# Patient Record
Sex: MALE | Race: WHITE | Employment: FULL TIME | URBAN - METROPOLITAN AREA
[De-identification: names, ages, dates, MRNs, and addresses within clinical notes are randomized per-mention and may not be internally consistent; named-entity substitution may affect disease eponyms.]

---

## 2018-09-21 PROBLEM — R97.20 ELEVATED PSA: Status: ACTIVE | Noted: 2018-09-21

## 2018-09-21 PROBLEM — R39.15 URINARY URGENCY: Status: ACTIVE | Noted: 2018-09-21

## 2019-01-22 ENCOUNTER — ANESTHESIA EVENT (OUTPATIENT)
Dept: SURGERY | Age: 64
DRG: 708 | End: 2019-01-22
Payer: COMMERCIAL

## 2019-01-22 NOTE — H&P
ASSESSMENT:  
    ICD-10-CM ICD-9-CM    
1. Malignant neoplasm of prostate (HCC) C61 185 AMB POC URINALYSIS DIP STICK AUTO W/O MICRO 2. Hypogonadism in male E29.1 257.2    
3. Urinary urgency R39.15 788.63    
  
[id number] [place] 3+4 Adenocarcinoma of the Prostate. 2/12 cores positive involving 50-70% of cores. MRI fusion biopsy performed on 12/7/2018 for a PSA of 4.7 ng/mL. TRUS vol of 34.7 cc. 
            -Treatment options discussed including prostatectomy, radiation, HIFU, and cryoablation. Reviewed risks and benefits of each. H/o hypogonadism- currently on TRT (Androderm) for ~10 years 
  
Urinary urgency- currently on Hytrin 
  
PLAN:   
Pathology reviewed with patient Staging scans not recommended as yield is low Advised pt active surveillance is not recommended due to intermediate risks and continuance of TRT Discussed TRT encourages prostate cancer growth Recommend discontinuing Androderm patches Treatment options discussed Literature provided for patient to review Pt would like to proceed with robotic prostatectomy. Discussed risks and benefits. 
  
Preop labs and CXR all WNL. Cardiology clearance done 
  
DISCUSSION:  
He has had extensive counseling on treatment options and r/b of each. He desires surgery. We again reviewed r/b of surgery including but not limited to impact on QOL-- most prominently on urinary, bowel, and sexual function. We discussed potential for short and long term urinary incontinence and erectile dysfunction. We discussed potential for surgical complications including infection, bleeding, blood transfusion, injury to urinary and surrounding structures including rectum which could require primary repair or diverting colostomy, vascular injury, neuromuscular injury related to positioning, penile shortening, and bladder neck contracture. We discussed the possibility of positive margins and possible need for adjuvant or salvage therapies in the future. We discussed other perioperative risks including DVT, PE, CVA, MI, pneumonia, and death. He is well informed and all questions were answered. Suri Cummins MD  
 
Patient's BMI is out of the normal parameters. Information about BMI was given and patient was advised to follow-up with their PCP for further management. Chief Complaint Patient presents with  Prostate Cancer  
  
  
HISTORY OF PRESENT ILLNESS:  Bebeto Del Angel is a 58 y.o. male who presents today for consultation and has been referred by Dr. Stephanie Simmons for newly diagnosed prostate cancer. Patient had a biopsy on 12/7/2018 for a PSA of 4.7 ng/mL. Pathology revealed [place] 3+4 (1 core) and [place] 3+3 (1 core) Adenocarcinoma of the prostate. Here today to discuss treatment options. No family history of prostate cancer. 
  
He reports urinary urgency. Currently on Hytrin, recently increased dosage for urinary symptoms. Good FOS. Denies hematuria, dysuria, incontinence, and incomplete bladder emptying. Asymptomatic for infection. Denies bone or back pain. Good appetite and stable weight. 
  
Patient is currently on TRT for the last 10 years for hypogonadism. He reports baseline hypogonadism symptoms of low libido and lack of energy. He notes his Testosterone levels are monitored by PCP. He notes PCP would like T levels to remain at 500. Mos 
  
He notes he has lost ~ 50 lbs intentionally since 2010. 
  
ED: Using Levitra with benefit 
  
PMHx: A-fib, HLD, and cardiac stents x2 in 2010, S/p lap bowel resection Review of Systems Constitutional: Fever: No 
Skin: Rash: No 
HEENT: Hearing difficulty: No 
Eyes: Blurred vision: No 
Cardiovascular: Chest pain: No 
Respiratory: Shortness of breath: No 
Gastrointestinal: Nausea/vomiting: No 
Musculoskeletal: Back pain: No 
Neurological: Weakness: No 
Psychological: Memory loss: No 
Comments/additional findings:  
  
  
    
Past Medical History:  
Diagnosis Date  Arrhythmia    
  A-fib  BPH with obstruction/lower urinary tract symptoms    
 CAD (coronary artery disease) 2010  
  heart cath  Elevated PSA    
 HLD (hyperlipidemia)    
 HTN (hypertension)   Hypercholesterolemia    
 Hypogonadism male    
 Low testosterone    
 Nocturia    
 Non-nicotine vapor product user 2017  
  Small bowel Obstruction  ONI on CPAP 2012  
  CPAP  Q nite  Reflux esophagitis    
 Urinary urgency    
  
  
     
Past Surgical History:  
Procedure Laterality Date  ABDOMEN SURGERY PROC UNLISTED   2011  
  Hernia Repair  CARDIAC SURG PROCEDURE UNLIST   2010  
  Coronary Stent x2  
 HX CORONARY STENT PLACEMENT      
 HX GI   2017  
  small bowel resection  HX TONSILLECTOMY      
 HX UROLOGICAL   2018  
  MRI fusion bx. Dr. Jackeline Ramirez.  
  
  
Social History  
  
     
Tobacco Use  Smoking status: Former Smoker  
    Types: Pipe  
    Last attempt to quit: 1980  
    Years since quittin.9  Smokeless tobacco: Never Used  Tobacco comment: smoke a PIPE Substance Use Topics  Alcohol use: Yes  
    Alcohol/week: 4.2 oz  
    Types: 7 Glasses of wine per week  
    Comment: daily  Drug use: No  
  
  
No Known Allergies 
  
Family History Family history unknown: Yes  
  
  
      
Current Outpatient Medications Medication Sig Dispense Refill  terazosin (HYTRIN) 10 mg capsule Take 1 Cap by mouth nightly. 90 Cap 3  
 ALPRAZolam (XANAX) 0.25 mg tablet 0.25 mg.      
 ELIQUIS 5 mg tablet        
 metoprolol succinate (TOPROL-XL) 25 mg XL tablet        
 lansoprazole (PREVACID) 30 mg capsule 30 mg.      
 MV-Min-Folic Acid-Lutein 260-955 mcg chew Take 1 Tab by Mouth Once a Day.       
 peg 400-propylene glycol (SYSTANE) 0.4-0.3 % drop Instill 1 Drop in eye Twice Daily.      
 rosuvastatin (CRESTOR) 20 mg tablet 20 mg.      
 testosterone (ANDRODERM) 4 mg/24 hr pt24 4 mg.      
 LEVITRA 20 mg tablet        
  dilTIAZem ER (CARDIZEM LA) 180 mg Tb24 tablet Take 180 mg by mouth daily.      
 coenzyme q10 10 mg cap Take  by mouth.      
 Biotin 2,500 mcg cap Take 5,000 Units by mouth two (2) times a day.      
 cetirizine (ZYRTEC) 10 mg tablet Take  by mouth.      
  
Any elements of the PMH, FH, SHx, ROS, or preliminary elements of the HPI that were entered by a medical assistant have been reviewed in full. 
  
PHYSICAL EXAMINATION:  
Visit Vitals /86 Ht 5' 11\" (1.803 m) Wt 220 lb (99.8 kg) BMI 30.68 kg/m²  
  
Constitutional: WDWN, Pleasant and appropriate affect, No acute distress. CV:  No peripheral swelling noted Respiratory: No respiratory distress or difficulties Abdomen:  No abdominal masses or tenderness. No CVA tenderness. No inguinal hernias noted.  Male: Dilcia Toribio Skin: No jaundice. Neuro/Psych:  Alert and oriented x 3, affect appropriate. Lymphatic:   No enlarged inguinal lymph nodes.   
  
  
REVIEW OF LABS AND IMAGING:   
     
Results for orders placed or performed in visit on 12/19/18 AMB POC URINALYSIS DIP STICK AUTO W/O MICRO Result Value Ref Range  
  Color (UA POC)      
  Clarity (UA POC)      
  Glucose (UA POC) Negative Negative  
  Bilirubin (UA POC) 1+ Negative  
  Ketones (UA POC) Trace Negative  
  Specific gravity (UA POC) 1.015 1.001 - 1.035  
  Blood (UA POC) 2+ Negative  
  pH (UA POC) 7.0 4.6 - 8.0  
  Protein (UA POC) Trace Negative  
  Urobilinogen (UA POC) 1 mg/dL 0.2 - 1  
  Nitrites (UA POC) Negative Negative  
  Leukocyte esterase (UA POC) Negative Negative  
  Biopsy Pathology 12/7/2018: 
FINAL DIAGNOSIS: 
A. PROSTATE BIOPSY, RIGHT BASE: 
- BENIGN PROSTATIC TISSUE. B. PROSTATE BIOPSY, RIGHT LATERAL BASE: 
- BENIGN PROSTATIC TISSUE. C. PROSTATE BIOPSY, RIGHT MID: 
- BENIGN PROSTATIC TISSUE. D. PROSTATE BIOPSY, RIGHT LATERAL MID: 
- BENIGN PROSTATIC TISSUE. E. PROSTATE BIOPSY, RIGHT APEX: 
- BENIGN PROSTATIC TISSUE. F. PROSTATE BIOPSY, RIGHT LATERAL APEX: 
- BENIGN PROSTATIC TISSUE. G. PROSTATE BIOPSY, LEFT BASE: 
- ATYPICAL SMALL ACINAR PROLIFERATION (ASAP). H. PROSTATE BIOPSY, LEFT LATERAL BASE: 
- BENIGN PROSTATIC TISSUE. I. PROSTATE BIOPSY, LEFT MID: 
- ATYPICAL SMALL ACINAR PROLIFERATION (ASAP). J. PROSTATE BIOPSY, LEFT LATERAL MID: 
- PROSTATIC ADENOCARCINOMA, DENIZ SCORE = 7 (3 + 4), GRADE GROUP 2, INVOLVING ONE OUT OF ONE CORE BIOPSY AND 70% OF THE BIOPSY SURFACE 
AREA. K. PROSTATE BIOPSY, LEFT APEX: 
- BENIGN PROSTATIC TISSUE. L. PROSTATE BIOPSY, REGION OF INTEREST/LEFT LATERAL APEX: 
- PROSTATIC ADENOCARCINOMA, DENIZ SCORE = 6 (3 + 3), GRADE GROUP 1, INVOLVING FOUR OUT OF SIX CORE BIOPSIES AND 50% OF THE BIOPSY Roman Lowry MD  
Urologic Oncologist 
[facility] and Shaka Chen MD Professorship Professor of Urology [employer] Office [phone] Ext [phone] 
  
 
Date of Surgery Update: Vasyl Cardoso was seen and examined. History and physical has been reviewed. The patient has been examined. There have been no significant clinical changes since the completion of the originally dated History and Physical. 
Stopped Eliquis > 48 hrs ago, off ASA x 1 wk. Pt understands the risks and benefits of the procedure and agrees to proceed.  
 
Signed By: Rian Schwab, MD   
 January 23, 2019 12:06 PM

## 2019-01-22 NOTE — PERIOP NOTES
PAT - SURGICAL PRE-ADMISSION INSTRUCTIONS 
 
NAME:  Paola Cabello DATE:  1/22/2019 SURGERY DATE:  1/23/2019                                  SURGERY ARRIVAL TIME:   TBA 1. Do NOT eat or drink anything, including candy or gum, after MIDNIGHT on 1/22/19 , unless you have specific instructions from your Surgeon or Anesthesia Provider to do so. 2. No smoking on the day of surgery. 3. No alcohol 24 hours prior to the day of surgery. 4. No recreational drugs for one week prior to the day of surgery. 5. Leave all valuables, including money/purse, at home. 6. Remove all jewelry, nail polish, makeup (including mascara); no lotions, powders, deodorant, or perfume/cologne/after shave. 7. Glasses/Contact lenses and Dentures may be worn to the hospital.  They will be removed prior to surgery. 8. Call your doctor if symptoms of a cold or illness develop within 24 ours prior to surgery. 9. AN ADULT MUST DRIVE YOU HOME AFTER OUTPATIENT SURGERY. 10. If you are having an OUTPATIENT procedure, please make arrangements for a responsible adult to be with you for 24 hours after your surgery. 11. If you are admitted to the hospital, you will be assigned to a bed after surgery is complete. Normally a family member will not be able to see you until you are in your assigned bed. 15. Family is encouraged to accompany you to the hospital.  We ask visitors in the treatment area to be limited to ONE person at a time to ensure patient privacy. EXCEPTIONS WILL BE MADE AS NEEDED. 15. Children under 12 are discouraged from entering the treatment area and need to be supervised by an adult when in the waiting room. Special Instructions: 
 
Bring CPAP., Complete bowel prep per MD instructions., STOP anticoagulants AT LEAST 1 WEEK PRIOR to your surgery or, follow other MD instructions:  Heidi Keene Patient Prep: 
 
shower with anti-bacterial soap These surgical instructions were reviewed with PATIENT during the PAT PHONE CALL. Directions: On the morning of surgery, please go to the [facility]. Enter the building from the [facility] entrance, 1st floor (next to the Emergency Room entrance). Take the elevator to the 2nd floor. Sign in at the Registration Desk. If you have any questions and/or concerns, please do not hesitate to call: 
(Prior to the day of surgery)  [facility] unit:  [phone] (Day of surgery)  [facility] unit:  [phone]

## 2019-01-23 ENCOUNTER — HOSPITAL ENCOUNTER (INPATIENT)
Age: 64
LOS: 1 days | Discharge: HOME | DRG: 708 | End: 2019-01-24
Attending: UROLOGY | Admitting: UROLOGY
Payer: COMMERCIAL

## 2019-01-23 ENCOUNTER — ANESTHESIA (OUTPATIENT)
Dept: SURGERY | Age: 64
DRG: 708 | End: 2019-01-23
Payer: COMMERCIAL

## 2019-01-23 DIAGNOSIS — C61 MALIGNANT NEOPLASM OF PROSTATE (HCC): Primary | ICD-10-CM

## 2019-01-23 LAB
ABO + RH BLD: NORMAL
BLOOD GROUP ANTIBODIES SERPL: NORMAL
SPECIMEN EXP DATE BLD: NORMAL

## 2019-01-23 PROCEDURE — 74011000250 HC RX REV CODE- 250

## 2019-01-23 PROCEDURE — 77030010939 HC CLP LIG TELE -B: Performed by: UROLOGY

## 2019-01-23 PROCEDURE — 77030008477 HC STYL SATN SLP COVD -A: Performed by: ANESTHESIOLOGY

## 2019-01-23 PROCEDURE — 77030016151 HC PROTCTR LNS DFOG COVD -B: Performed by: UROLOGY

## 2019-01-23 PROCEDURE — 74011250637 HC RX REV CODE- 250/637: Performed by: UROLOGY

## 2019-01-23 PROCEDURE — 76210000016 HC OR PH I REC 1 TO 1.5 HR: Performed by: UROLOGY

## 2019-01-23 PROCEDURE — 77030020263 HC SOL INJ SOD CL0.9% LFCR 1000ML: Performed by: UROLOGY

## 2019-01-23 PROCEDURE — 77030032490 HC SLV COMPR SCD KNE COVD -B: Performed by: UROLOGY

## 2019-01-23 PROCEDURE — 77030035684 HC CAP REDUC TRCR ENDOSC 1SEAL J&J -B: Performed by: UROLOGY

## 2019-01-23 PROCEDURE — 77030008683 HC TU ET CUF COVD -A: Performed by: ANESTHESIOLOGY

## 2019-01-23 PROCEDURE — C1729 CATH, DRAINAGE: HCPCS | Performed by: UROLOGY

## 2019-01-23 PROCEDURE — 88305 TISSUE EXAM BY PATHOLOGIST: CPT

## 2019-01-23 PROCEDURE — 77030035045 HC TRCR ENDOSC VRSPRT BLDLSS COVD -B: Performed by: UROLOGY

## 2019-01-23 PROCEDURE — 86900 BLOOD TYPING SEROLOGIC ABO: CPT

## 2019-01-23 PROCEDURE — 77030008606 HC TRCR ENDOSC KII AMR -B: Performed by: UROLOGY

## 2019-01-23 PROCEDURE — 77030018846 HC SOL IRR STRL H20 ICUM -A: Performed by: UROLOGY

## 2019-01-23 PROCEDURE — 74011250637 HC RX REV CODE- 250/637: Performed by: NURSE ANESTHETIST, CERTIFIED REGISTERED

## 2019-01-23 PROCEDURE — 8E0W4CZ ROBOTIC ASSISTED PROCEDURE OF TRUNK REGION, PERCUTANEOUS ENDOSCOPIC APPROACH: ICD-10-PCS | Performed by: UROLOGY

## 2019-01-23 PROCEDURE — 74011250636 HC RX REV CODE- 250/636: Performed by: UROLOGY

## 2019-01-23 PROCEDURE — 77030010513 HC APPL CLP LIG J&J -C: Performed by: UROLOGY

## 2019-01-23 PROCEDURE — 74011250636 HC RX REV CODE- 250/636

## 2019-01-23 PROCEDURE — 77030009852 HC PCH RTVR ENDOSC COVD -B: Performed by: UROLOGY

## 2019-01-23 PROCEDURE — 0VT04ZZ RESECTION OF PROSTATE, PERCUTANEOUS ENDOSCOPIC APPROACH: ICD-10-PCS | Performed by: UROLOGY

## 2019-01-23 PROCEDURE — 0VBQ4ZZ EXCISION OF BILATERAL VAS DEFERENS, PERCUTANEOUS ENDOSCOPIC APPROACH: ICD-10-PCS | Performed by: UROLOGY

## 2019-01-23 PROCEDURE — 77010033678 HC OXYGEN DAILY

## 2019-01-23 PROCEDURE — 76060000040 HC ANESTHESIA 4.5 TO 5 HR: Performed by: UROLOGY

## 2019-01-23 PROCEDURE — 77030035048 HC TRCR ENDOSC OPTCL COVD -B: Performed by: UROLOGY

## 2019-01-23 PROCEDURE — 77030018813 HC SCIS LAPSCP EPIX DISP AMR -B: Performed by: UROLOGY

## 2019-01-23 PROCEDURE — 77030013079 HC BLNKT BAIR HGGR 3M -A: Performed by: ANESTHESIOLOGY

## 2019-01-23 PROCEDURE — 77030039266 HC ADH SKN EXOFIN S2SG -A: Performed by: UROLOGY

## 2019-01-23 PROCEDURE — 74011000250 HC RX REV CODE- 250: Performed by: UROLOGY

## 2019-01-23 PROCEDURE — 77030002966 HC SUT PDS J&J -A: Performed by: UROLOGY

## 2019-01-23 PROCEDURE — 77030010935 HC CLP LIG ABSRB TELE -B: Performed by: UROLOGY

## 2019-01-23 PROCEDURE — 0VT34ZZ RESECTION OF BILATERAL SEMINAL VESICLES, PERCUTANEOUS ENDOSCOPIC APPROACH: ICD-10-PCS | Performed by: UROLOGY

## 2019-01-23 PROCEDURE — 77030002933 HC SUT MCRYL J&J -A: Performed by: UROLOGY

## 2019-01-23 PROCEDURE — 77030002986 HC SUT PROL J&J -A: Performed by: UROLOGY

## 2019-01-23 PROCEDURE — 74011250636 HC RX REV CODE- 250/636: Performed by: NURSE ANESTHETIST, CERTIFIED REGISTERED

## 2019-01-23 PROCEDURE — 77030009848 HC PASSR SUT SET COOP -C: Performed by: UROLOGY

## 2019-01-23 PROCEDURE — 77030010545: Performed by: UROLOGY

## 2019-01-23 PROCEDURE — 77030013449 HC CLP LIG TELE -A: Performed by: UROLOGY

## 2019-01-23 PROCEDURE — 88304 TISSUE EXAM BY PATHOLOGIST: CPT

## 2019-01-23 PROCEDURE — 77030018846 HC SOL IRR STRL H20 ICUM -A

## 2019-01-23 PROCEDURE — 77030034696 HC CATH URETH FOL 2W BARD -A: Performed by: UROLOGY

## 2019-01-23 PROCEDURE — 77030022704 HC SUT VLOC COVD -B: Performed by: UROLOGY

## 2019-01-23 PROCEDURE — 77030008771 HC TU NG SALEM SUMP -A: Performed by: ANESTHESIOLOGY

## 2019-01-23 PROCEDURE — 77030035277 HC OBTRTR BLDELSS DISP INTU -B: Performed by: UROLOGY

## 2019-01-23 PROCEDURE — 88309 TISSUE EXAM BY PATHOLOGIST: CPT

## 2019-01-23 PROCEDURE — 77030031139 HC SUT VCRL2 J&J -A: Performed by: UROLOGY

## 2019-01-23 PROCEDURE — 76010000881 HC OR TIME 4.5 TO 5HR INTENSV - TIER 2: Performed by: UROLOGY

## 2019-01-23 PROCEDURE — 77030003028 HC SUT VCRL J&J -A: Performed by: UROLOGY

## 2019-01-23 PROCEDURE — 77030008462 HC STPLR SKN PROX J&J -A: Performed by: UROLOGY

## 2019-01-23 PROCEDURE — 65270000029 HC RM PRIVATE

## 2019-01-23 PROCEDURE — 74011000272 HC RX REV CODE- 272: Performed by: UROLOGY

## 2019-01-23 RX ORDER — OXYCODONE AND ACETAMINOPHEN 5; 325 MG/1; MG/1
1 TABLET ORAL
Status: DISCONTINUED | OUTPATIENT
Start: 2019-01-23 | End: 2019-01-24 | Stop reason: HOSPADM

## 2019-01-23 RX ORDER — MORPHINE SULFATE 4 MG/ML
2-4 INJECTION INTRAVENOUS
Status: DISCONTINUED | OUTPATIENT
Start: 2019-01-23 | End: 2019-01-23 | Stop reason: HOSPADM

## 2019-01-23 RX ORDER — OXYCODONE AND ACETAMINOPHEN 5; 325 MG/1; MG/1
1-2 TABLET ORAL
Status: DISCONTINUED | OUTPATIENT
Start: 2019-01-23 | End: 2019-01-23 | Stop reason: HOSPADM

## 2019-01-23 RX ORDER — TERAZOSIN 5 MG/1
10 CAPSULE ORAL
Status: DISCONTINUED | OUTPATIENT
Start: 2019-01-24 | End: 2019-01-24 | Stop reason: HOSPADM

## 2019-01-23 RX ORDER — GLYCOPYRROLATE 0.2 MG/ML
INJECTION INTRAMUSCULAR; INTRAVENOUS AS NEEDED
Status: DISCONTINUED | OUTPATIENT
Start: 2019-01-23 | End: 2019-01-23 | Stop reason: HOSPADM

## 2019-01-23 RX ORDER — OXYBUTYNIN CHLORIDE 5 MG/1
5 TABLET ORAL 3 TIMES DAILY
Qty: 30 TAB | Refills: 0 | Status: SHIPPED | OUTPATIENT
Start: 2019-01-23 | End: 2019-02-26

## 2019-01-23 RX ORDER — FAMOTIDINE 20 MG/1
TABLET, FILM COATED ORAL
Status: DISPENSED
Start: 2019-01-23 | End: 2019-01-24

## 2019-01-23 RX ORDER — ACETAMINOPHEN 325 MG/1
650 TABLET ORAL
Status: DISCONTINUED | OUTPATIENT
Start: 2019-01-23 | End: 2019-01-24 | Stop reason: HOSPADM

## 2019-01-23 RX ORDER — MIDAZOLAM HYDROCHLORIDE 1 MG/ML
INJECTION, SOLUTION INTRAMUSCULAR; INTRAVENOUS AS NEEDED
Status: DISCONTINUED | OUTPATIENT
Start: 2019-01-23 | End: 2019-01-23 | Stop reason: HOSPADM

## 2019-01-23 RX ORDER — CEFAZOLIN SODIUM 2 G/50ML
2 SOLUTION INTRAVENOUS EVERY 8 HOURS
Status: COMPLETED | OUTPATIENT
Start: 2019-01-23 | End: 2019-01-24

## 2019-01-23 RX ORDER — METHYLENE BLUE 10 MG/ML
INJECTION INTRAVENOUS AS NEEDED
Status: DISCONTINUED | OUTPATIENT
Start: 2019-01-23 | End: 2019-01-23 | Stop reason: HOSPADM

## 2019-01-23 RX ORDER — BUPIVACAINE HYDROCHLORIDE AND EPINEPHRINE 2.5; 5 MG/ML; UG/ML
INJECTION, SOLUTION EPIDURAL; INFILTRATION; INTRACAUDAL; PERINEURAL AS NEEDED
Status: DISCONTINUED | OUTPATIENT
Start: 2019-01-23 | End: 2019-01-23 | Stop reason: HOSPADM

## 2019-01-23 RX ORDER — SODIUM CHLORIDE 0.9 % (FLUSH) 0.9 %
5-40 SYRINGE (ML) INJECTION AS NEEDED
Status: DISCONTINUED | OUTPATIENT
Start: 2019-01-23 | End: 2019-01-23 | Stop reason: HOSPADM

## 2019-01-23 RX ORDER — HEPARIN SODIUM 5000 [USP'U]/ML
5000 INJECTION, SOLUTION INTRAVENOUS; SUBCUTANEOUS
Status: COMPLETED | OUTPATIENT
Start: 2019-01-23 | End: 2019-01-23

## 2019-01-23 RX ORDER — FENTANYL CITRATE 50 UG/ML
INJECTION, SOLUTION INTRAMUSCULAR; INTRAVENOUS AS NEEDED
Status: DISCONTINUED | OUTPATIENT
Start: 2019-01-23 | End: 2019-01-23 | Stop reason: HOSPADM

## 2019-01-23 RX ORDER — ONDANSETRON 2 MG/ML
4 INJECTION INTRAMUSCULAR; INTRAVENOUS
Status: DISCONTINUED | OUTPATIENT
Start: 2019-01-23 | End: 2019-01-24 | Stop reason: HOSPADM

## 2019-01-23 RX ORDER — LIDOCAINE HYDROCHLORIDE 20 MG/ML
INJECTION, SOLUTION EPIDURAL; INFILTRATION; INTRACAUDAL; PERINEURAL AS NEEDED
Status: DISCONTINUED | OUTPATIENT
Start: 2019-01-23 | End: 2019-01-23 | Stop reason: HOSPADM

## 2019-01-23 RX ORDER — NEOSTIGMINE METHYLSULFATE 5 MG/5 ML
SYRINGE (ML) INTRAVENOUS AS NEEDED
Status: DISCONTINUED | OUTPATIENT
Start: 2019-01-23 | End: 2019-01-23 | Stop reason: HOSPADM

## 2019-01-23 RX ORDER — NALOXONE HYDROCHLORIDE 0.4 MG/ML
0.4 INJECTION, SOLUTION INTRAMUSCULAR; INTRAVENOUS; SUBCUTANEOUS AS NEEDED
Status: DISCONTINUED | OUTPATIENT
Start: 2019-01-23 | End: 2019-01-24 | Stop reason: HOSPADM

## 2019-01-23 RX ORDER — METOPROLOL SUCCINATE 25 MG/1
25 TABLET, EXTENDED RELEASE ORAL DAILY
Status: DISCONTINUED | OUTPATIENT
Start: 2019-01-23 | End: 2019-01-24 | Stop reason: HOSPADM

## 2019-01-23 RX ORDER — SODIUM CHLORIDE, SODIUM LACTATE, POTASSIUM CHLORIDE, CALCIUM CHLORIDE 600; 310; 30; 20 MG/100ML; MG/100ML; MG/100ML; MG/100ML
25 INJECTION, SOLUTION INTRAVENOUS CONTINUOUS
Status: DISCONTINUED | OUTPATIENT
Start: 2019-01-23 | End: 2019-01-23 | Stop reason: HOSPADM

## 2019-01-23 RX ORDER — FENTANYL CITRATE 50 UG/ML
50 INJECTION, SOLUTION INTRAMUSCULAR; INTRAVENOUS
Status: DISCONTINUED | OUTPATIENT
Start: 2019-01-23 | End: 2019-01-23 | Stop reason: HOSPADM

## 2019-01-23 RX ORDER — DEXTROSE MONOHYDRATE 25 G/50ML
25-50 INJECTION, SOLUTION INTRAVENOUS AS NEEDED
Status: DISCONTINUED | OUTPATIENT
Start: 2019-01-23 | End: 2019-01-23 | Stop reason: HOSPADM

## 2019-01-23 RX ORDER — CEFAZOLIN SODIUM 2 G/50ML
2 SOLUTION INTRAVENOUS
Status: COMPLETED | OUTPATIENT
Start: 2019-01-23 | End: 2019-01-23

## 2019-01-23 RX ORDER — LIDOCAINE HYDROCHLORIDE 10 MG/ML
0.1 INJECTION, SOLUTION EPIDURAL; INFILTRATION; INTRACAUDAL; PERINEURAL AS NEEDED
Status: DISCONTINUED | OUTPATIENT
Start: 2019-01-23 | End: 2019-01-23 | Stop reason: HOSPADM

## 2019-01-23 RX ORDER — SODIUM CHLORIDE 0.9 % (FLUSH) 0.9 %
5-40 SYRINGE (ML) INJECTION EVERY 8 HOURS
Status: DISCONTINUED | OUTPATIENT
Start: 2019-01-23 | End: 2019-01-23 | Stop reason: HOSPADM

## 2019-01-23 RX ORDER — MAGNESIUM SULFATE 100 %
4 CRYSTALS MISCELLANEOUS AS NEEDED
Status: DISCONTINUED | OUTPATIENT
Start: 2019-01-23 | End: 2019-01-23 | Stop reason: HOSPADM

## 2019-01-23 RX ORDER — PANTOPRAZOLE SODIUM 40 MG/1
40 TABLET, DELAYED RELEASE ORAL
Status: DISCONTINUED | OUTPATIENT
Start: 2019-01-24 | End: 2019-01-24 | Stop reason: HOSPADM

## 2019-01-23 RX ORDER — SODIUM CHLORIDE 0.9 % (FLUSH) 0.9 %
5-40 SYRINGE (ML) INJECTION EVERY 8 HOURS
Status: DISCONTINUED | OUTPATIENT
Start: 2019-01-23 | End: 2019-01-24 | Stop reason: HOSPADM

## 2019-01-23 RX ORDER — ONDANSETRON 2 MG/ML
4 INJECTION INTRAMUSCULAR; INTRAVENOUS
Status: DISCONTINUED | OUTPATIENT
Start: 2019-01-23 | End: 2019-01-23 | Stop reason: HOSPADM

## 2019-01-23 RX ORDER — ATROPA BELLADONNA AND OPIUM 16.2; 3 MG/1; MG/1
SUPPOSITORY RECTAL AS NEEDED
Status: DISCONTINUED | OUTPATIENT
Start: 2019-01-23 | End: 2019-01-23 | Stop reason: HOSPADM

## 2019-01-23 RX ORDER — HYDROMORPHONE HYDROCHLORIDE 1 MG/ML
INJECTION, SOLUTION INTRAMUSCULAR; INTRAVENOUS; SUBCUTANEOUS AS NEEDED
Status: DISCONTINUED | OUTPATIENT
Start: 2019-01-23 | End: 2019-01-23 | Stop reason: HOSPADM

## 2019-01-23 RX ORDER — DIPHENHYDRAMINE HYDROCHLORIDE 50 MG/ML
12.5 INJECTION, SOLUTION INTRAMUSCULAR; INTRAVENOUS
Status: DISCONTINUED | OUTPATIENT
Start: 2019-01-23 | End: 2019-01-24 | Stop reason: HOSPADM

## 2019-01-23 RX ORDER — SODIUM CHLORIDE, SODIUM LACTATE, POTASSIUM CHLORIDE, CALCIUM CHLORIDE 600; 310; 30; 20 MG/100ML; MG/100ML; MG/100ML; MG/100ML
50 INJECTION, SOLUTION INTRAVENOUS CONTINUOUS
Status: DISCONTINUED | OUTPATIENT
Start: 2019-01-24 | End: 2019-01-23 | Stop reason: HOSPADM

## 2019-01-23 RX ORDER — ROSUVASTATIN CALCIUM 10 MG/1
20 TABLET, COATED ORAL
Status: DISCONTINUED | OUTPATIENT
Start: 2019-01-23 | End: 2019-01-24 | Stop reason: HOSPADM

## 2019-01-23 RX ORDER — OXYBUTYNIN CHLORIDE 5 MG/1
5 TABLET ORAL
Status: DISCONTINUED | OUTPATIENT
Start: 2019-01-23 | End: 2019-01-24 | Stop reason: HOSPADM

## 2019-01-23 RX ORDER — PROPOFOL 10 MG/ML
INJECTION, EMULSION INTRAVENOUS AS NEEDED
Status: DISCONTINUED | OUTPATIENT
Start: 2019-01-23 | End: 2019-01-23 | Stop reason: HOSPADM

## 2019-01-23 RX ORDER — SODIUM CHLORIDE 9 MG/ML
INJECTION, SOLUTION INTRAVENOUS
Status: DISCONTINUED | OUTPATIENT
Start: 2019-01-23 | End: 2019-01-23 | Stop reason: HOSPADM

## 2019-01-23 RX ORDER — SODIUM CHLORIDE, SODIUM LACTATE, POTASSIUM CHLORIDE, CALCIUM CHLORIDE 600; 310; 30; 20 MG/100ML; MG/100ML; MG/100ML; MG/100ML
125 INJECTION, SOLUTION INTRAVENOUS CONTINUOUS
Status: DISCONTINUED | OUTPATIENT
Start: 2019-01-23 | End: 2019-01-24 | Stop reason: HOSPADM

## 2019-01-23 RX ORDER — VECURONIUM BROMIDE FOR INJECTION 1 MG/ML
INJECTION, POWDER, LYOPHILIZED, FOR SOLUTION INTRAVENOUS AS NEEDED
Status: DISCONTINUED | OUTPATIENT
Start: 2019-01-23 | End: 2019-01-23 | Stop reason: HOSPADM

## 2019-01-23 RX ORDER — OXYCODONE AND ACETAMINOPHEN 5; 325 MG/1; MG/1
1 TABLET ORAL
Qty: 20 TAB | Refills: 0 | Status: SHIPPED | OUTPATIENT
Start: 2019-01-23 | End: 2019-02-26

## 2019-01-23 RX ORDER — SODIUM CHLORIDE 0.9 % (FLUSH) 0.9 %
5-40 SYRINGE (ML) INJECTION AS NEEDED
Status: DISCONTINUED | OUTPATIENT
Start: 2019-01-23 | End: 2019-01-24 | Stop reason: HOSPADM

## 2019-01-23 RX ORDER — ONDANSETRON 2 MG/ML
INJECTION INTRAMUSCULAR; INTRAVENOUS AS NEEDED
Status: DISCONTINUED | OUTPATIENT
Start: 2019-01-23 | End: 2019-01-23 | Stop reason: HOSPADM

## 2019-01-23 RX ORDER — TERAZOSIN 5 MG/1
10 CAPSULE ORAL
Status: COMPLETED | OUTPATIENT
Start: 2019-01-23 | End: 2019-01-23

## 2019-01-23 RX ORDER — VARDENAFIL HYDROCHLORIDE 20 MG/1
20 TABLET ORAL
Qty: 36 TAB | Refills: 3 | Status: SHIPPED | OUTPATIENT
Start: 2019-01-23 | End: 2020-02-27 | Stop reason: ALTCHOICE

## 2019-01-23 RX ORDER — HYDROMORPHONE HYDROCHLORIDE 1 MG/ML
1 INJECTION, SOLUTION INTRAMUSCULAR; INTRAVENOUS; SUBCUTANEOUS
Status: DISCONTINUED | OUTPATIENT
Start: 2019-01-23 | End: 2019-01-24 | Stop reason: HOSPADM

## 2019-01-23 RX ORDER — SUCCINYLCHOLINE CHLORIDE 20 MG/ML
INJECTION INTRAMUSCULAR; INTRAVENOUS AS NEEDED
Status: DISCONTINUED | OUTPATIENT
Start: 2019-01-23 | End: 2019-01-23 | Stop reason: HOSPADM

## 2019-01-23 RX ORDER — DOCUSATE SODIUM 100 MG/1
100 CAPSULE, LIQUID FILLED ORAL 2 TIMES DAILY
Qty: 60 CAP | Refills: 0 | Status: SHIPPED | OUTPATIENT
Start: 2019-01-23 | End: 2019-02-22

## 2019-01-23 RX ORDER — DILTIAZEM HYDROCHLORIDE 180 MG/1
180 CAPSULE, COATED, EXTENDED RELEASE ORAL DAILY
Status: DISCONTINUED | OUTPATIENT
Start: 2019-01-23 | End: 2019-01-24 | Stop reason: HOSPADM

## 2019-01-23 RX ORDER — HEPARIN SODIUM 5000 [USP'U]/ML
5000 INJECTION, SOLUTION INTRAVENOUS; SUBCUTANEOUS EVERY 8 HOURS
Status: DISCONTINUED | OUTPATIENT
Start: 2019-01-24 | End: 2019-01-24 | Stop reason: HOSPADM

## 2019-01-23 RX ORDER — FAMOTIDINE 20 MG/1
20 TABLET, FILM COATED ORAL ONCE
Status: COMPLETED | OUTPATIENT
Start: 2019-01-24 | End: 2019-01-23

## 2019-01-23 RX ADMIN — Medication 10 ML: at 22:22

## 2019-01-23 RX ADMIN — FAMOTIDINE 20 MG: 20 TABLET ORAL at 12:24

## 2019-01-23 RX ADMIN — VECURONIUM BROMIDE FOR INJECTION 0.5 MG: 1 INJECTION, POWDER, LYOPHILIZED, FOR SOLUTION INTRAVENOUS at 15:56

## 2019-01-23 RX ADMIN — FENTANYL CITRATE 50 MCG: 50 INJECTION, SOLUTION INTRAMUSCULAR; INTRAVENOUS at 13:26

## 2019-01-23 RX ADMIN — METOPROLOL SUCCINATE 25 MG: 25 TABLET, EXTENDED RELEASE ORAL at 22:14

## 2019-01-23 RX ADMIN — HYDROMORPHONE HYDROCHLORIDE 1 MG: 1 INJECTION, SOLUTION INTRAMUSCULAR; INTRAVENOUS; SUBCUTANEOUS at 21:16

## 2019-01-23 RX ADMIN — SODIUM CHLORIDE, SODIUM LACTATE, POTASSIUM CHLORIDE, AND CALCIUM CHLORIDE 50 ML/HR: 600; 310; 30; 20 INJECTION, SOLUTION INTRAVENOUS at 12:29

## 2019-01-23 RX ADMIN — ROSUVASTATIN CALCIUM 20 MG: 10 TABLET, FILM COATED ORAL at 22:12

## 2019-01-23 RX ADMIN — MIDAZOLAM HYDROCHLORIDE 2 MG: 1 INJECTION, SOLUTION INTRAMUSCULAR; INTRAVENOUS at 13:02

## 2019-01-23 RX ADMIN — OXYCODONE AND ACETAMINOPHEN 1 TABLET: 5; 325 TABLET ORAL at 18:11

## 2019-01-23 RX ADMIN — GLYCOPYRROLATE 0.4 MG: 0.2 INJECTION INTRAMUSCULAR; INTRAVENOUS at 17:10

## 2019-01-23 RX ADMIN — VECURONIUM BROMIDE FOR INJECTION 10 MG: 1 INJECTION, POWDER, LYOPHILIZED, FOR SOLUTION INTRAVENOUS at 13:23

## 2019-01-23 RX ADMIN — PROPOFOL 120 MG: 10 INJECTION, EMULSION INTRAVENOUS at 13:20

## 2019-01-23 RX ADMIN — ONDANSETRON 4 MG: 2 INJECTION INTRAMUSCULAR; INTRAVENOUS at 16:58

## 2019-01-23 RX ADMIN — FENTANYL CITRATE 50 MCG: 50 INJECTION, SOLUTION INTRAMUSCULAR; INTRAVENOUS at 13:15

## 2019-01-23 RX ADMIN — VECURONIUM BROMIDE FOR INJECTION 0.5 MG: 1 INJECTION, POWDER, LYOPHILIZED, FOR SOLUTION INTRAVENOUS at 16:35

## 2019-01-23 RX ADMIN — CEFAZOLIN SODIUM 2 G: 2 SOLUTION INTRAVENOUS at 13:32

## 2019-01-23 RX ADMIN — Medication 3 MG: at 17:10

## 2019-01-23 RX ADMIN — HEPARIN SODIUM 5000 UNITS: 5000 INJECTION, SOLUTION INTRAVENOUS; SUBCUTANEOUS at 12:00

## 2019-01-23 RX ADMIN — SODIUM CHLORIDE, SODIUM LACTATE, POTASSIUM CHLORIDE, AND CALCIUM CHLORIDE 125 ML/HR: 600; 310; 30; 20 INJECTION, SOLUTION INTRAVENOUS at 21:13

## 2019-01-23 RX ADMIN — CEFAZOLIN SODIUM 2 G: 2 SOLUTION INTRAVENOUS at 21:02

## 2019-01-23 RX ADMIN — DILTIAZEM HYDROCHLORIDE 180 MG: 180 CAPSULE, COATED, EXTENDED RELEASE ORAL at 22:14

## 2019-01-23 RX ADMIN — HYDROMORPHONE HYDROCHLORIDE 0.5 MG: 1 INJECTION, SOLUTION INTRAMUSCULAR; INTRAVENOUS; SUBCUTANEOUS at 13:40

## 2019-01-23 RX ADMIN — SUCCINYLCHOLINE CHLORIDE 100 MG: 20 INJECTION INTRAMUSCULAR; INTRAVENOUS at 13:20

## 2019-01-23 RX ADMIN — TERAZOSIN HYDROCHLORIDE 10 MG: 5 CAPSULE ORAL at 22:06

## 2019-01-23 RX ADMIN — SODIUM CHLORIDE: 9 INJECTION, SOLUTION INTRAVENOUS at 13:21

## 2019-01-23 RX ADMIN — HYDROMORPHONE HYDROCHLORIDE 0.5 MG: 1 INJECTION, SOLUTION INTRAMUSCULAR; INTRAVENOUS; SUBCUTANEOUS at 15:57

## 2019-01-23 RX ADMIN — LIDOCAINE HYDROCHLORIDE 60 MG: 20 INJECTION, SOLUTION EPIDURAL; INFILTRATION; INTRACAUDAL; PERINEURAL at 13:20

## 2019-01-23 RX ADMIN — VECURONIUM BROMIDE FOR INJECTION 3 MG: 1 INJECTION, POWDER, LYOPHILIZED, FOR SOLUTION INTRAVENOUS at 14:57

## 2019-01-23 RX ADMIN — FENTANYL CITRATE 50 MCG: 50 INJECTION, SOLUTION INTRAMUSCULAR; INTRAVENOUS at 17:50

## 2019-01-23 RX ADMIN — OXYBUTYNIN CHLORIDE 5 MG: 5 TABLET ORAL at 18:11

## 2019-01-23 RX ADMIN — OXYCODONE AND ACETAMINOPHEN 1 TABLET: 5; 325 TABLET ORAL at 23:55

## 2019-01-23 RX ADMIN — METHYLENE BLUE 5 ML: 10 INJECTION INTRAVENOUS at 15:46

## 2019-01-23 NOTE — ANESTHESIA PREPROCEDURE EVALUATION
Anesthetic History No history of anesthetic complications Review of Systems / Medical History Patient summary reviewed and pertinent labs reviewed Pulmonary Within defined limits Sleep apnea: No treatment Neuro/Psych Within defined limits Cardiovascular Within defined limits Hypertension Dysrhythmias : atrial fibrillation CAD Exercise tolerance: >4 METS 
  
GI/Hepatic/Renal 
  
GERD Endo/Other Within defined limits Other Findings Physical Exam 
 
Airway Mallampati: III 
TM Distance: 4 - 6 cm Neck ROM: normal range of motion Mouth opening: Normal 
 
 Cardiovascular Regular rate and rhythm,  S1 and S2 normal,  no murmur, click, rub, or gallop Rhythm: regular Rate: normal 
 
 
 
 Dental 
 
Dentition: Lower dentition intact and Upper dentition intact Pulmonary Breath sounds clear to auscultation Abdominal 
GI exam deferred Other Findings Anesthetic Plan ASA: 3 Anesthesia type: general 
 
 
 
 
Induction: Intravenous Anesthetic plan and risks discussed with: Patient

## 2019-01-23 NOTE — OP NOTES
DATE OF OPERATION:  1/23/19    PREOPERATIVE DIAGNOSIS:  Prostate cancer. POSTOPERATIVE DIAGNOSIS:  Prostate cancer. OPERATION PERFORMED:  1. DaVinci laparoscopic radical prostatectomy (Right full and left partial nerve Sparing)      SURGEON:  Layla Lowry MD    ASSISTANT SURGEONS:  Suzanne Storm    ANESTHESIA:  General.    SPECIMEN:  Prostate with bilateral seminal vesicals, anterior fat pad. FINDINGS:  Accessory Pudendal Vessel: none  Normal appearing prostate and pelvic lymph nodes  Floppy bladder    INDICATION:  Pt.is a 59 yo male who presented with elevated PSA. Prostate biopsies showed [place] 3 + 4 cancer involving the left side of his prostate. Treatment options were discussed with him  at length and he chose DaVinci radical prostatectomy. He has  partial erections and the plan is for Right full and left partial nerve sparing. Bilateral pelvic lymph node dissection was not planned due to his risk stratification. PROCEDURE IN DETAIL:  Patient was given Ancef preoperatively. He had sequential  compression devices applied preoperatively for DVT prophylaxis. He was taken to the operating room where he was induced with  general anesthesia. After adequate anesthesia, he was placed in  the dorsal lithotomy position. His arms were draped by his side  and was appropriately padded and secured to the operating room  table. He was placed in the Trendelenburg position. Rectal exam showed prostate to be 30 cc without nodule. He was prepped and draped in sterile fashion. An 25 Cymro Hernandez  was placed in the bladder and instilled 20 cc sterile water. Orogastric tube was placed. The Veress needle was passed just below the left subcostal margin and the abdomen was insufflated to 15  atmospheres. A 5 mm Visiport was passed under vision in the LLQ. The abdominal contents were examined. There were no adhesions.  The following trocars were placed under direct vision:  A 12 mm, blunt-tip trocar was placed just above  the umbilicus, 8  mm robotic trocars were placed 9 cm laterally and inferiorly to  the initially placed umbilical trocar. A third one was placed 7  cm lateral to the left-sided trocar. In the corresponding  position on the right side, a 12 mm trocar was placed, and then a  5 mm trocar was placed to the right and well above the umbilicus. The robot was then docked with the robot trocar. I used the  zero-degree camera. I had the hot scissors in the right hand  and the left hand with the Gyrus forceps and far left hand the  Cardier forceps. Initially I divided the median umbilical  ligament from the urachus and developed the space of Retzius down  to pubic bone. I divided the parietal peritoneum laterally up  to the vas deferens on each side. I used the Cardier forceps to  provide cranial traction on the urachus. I cleaned off the  Denonvilliers fascia on each side and then divided it with the  scissors laterally to the perirectal fat and medially to the  puboprostatic ligaments which were divided. I then ligated the  dorsal vein complex with a 2-0 Vicryl suture in a figure of eight fashion. I then addressed the bladder neck with a 30-degree down lens. I identified the bladder neck by pulling on the Hernandez catheter. I divided the anterior bladder neck musculature until I then  found the anterior bladder neck mucosa which was incised. I  identified the Hernandez catheter within, deflated the balloon,  pulled the Hernandez out through this opening and then using the  Solomon-Davy needle with a #0-Vicryl suture, passed to the  suprapubic region and pulled the suture through the eye of the  Hernandez and then back out. This allowed me to provide upward  traction on the prostate. I then divided the lateral bladder  neck mucosa and the posterior bladder neck mucosa. I was well  away from ureteral orifices.   I divided the posterior bladder  neck musculature until we identified the vas deferens. They were  freed proximally, then divided. I freed up the seminal vesicals  using blunt and sharp dissection. I placed clips on the vessels to the  seminal  vesicals and avoided cautery. I then went back to the 0-degree lens. I divided the  Denonvilliers fascia beneath the prostate and developed the  prostate off the rectum. I then did Right full and left partial nerve sparing by  dividing the lateral pelvic fascia dissecting off the prostate  capsule laterally. I then isolated the pedicles of the prostate  and placed weck clips on the pedicles of the prostate and then  divided it with cold scissors. I continued to divide the  neurovascular bundles off the prostate out to the apex of the  prostate. At this point the prostate was freed up except for the  urethra. I addressed the prostate anteriorly, divided the dorsal vein , then the  anterior urethral wall, pulled the Hernandez catheter back and then divided the   posterior urethral wall. Specimen was completely freed up. I  placed the prostate in an Endo catch bag and then placed the bag in the upper abdomen out of the way. I  then irrigated the pelvis. The rectal test was negative. With good hemostasis, I then did the posterior reconstruction. I took a 3-0 VLoc suture on an RB1 needle . I passed the  suture through the cut edges of Denonvilliers  fascia beneath the bladder on the right side and through the  posterior striated sphincter underneath the urethra. Then I ran  this from right to left. I then took the other end of the suture passing just proximal to the posterior  bladder neck in the midline and to the posterior striated sphincter  and ran this from right to left using 3 throws and reapproximated  these structures and then tied this suture to the other end of the  suture. I then did the anastomosis again with 3-0 VLoc suture on RB1  needle.    I passed both ends of the suture from the  outside-in through the bladder neck  at the  6 o'clock position. I passed both through the urethral  stump from the inside-out in the corresponding position. I  reapproximated the bladder neck to the urethra. I then ran the  Left suture on the left side anastomosis to the 9 o'clock  position. Then I went back to the right sided suture and ran that up  the right side to the 12 o'clock position. I then continued the  left suture to the 12 o'clock position. Patient was given  indigo carmine prior to this and there was good efflux in both  ureteral orifices. I then placed a new 21 Amharic Hernandez into the bladder and filled  it with 12 cc sterile water. I then secured the knot and then passed suture behind pubic bone for anterior suspension. I  irrigated the bladder with 200 cc. There was no leakage. There was reasonable hemostasis. The instruments were then removed. The robot was undocked and  all the trocars were removed under direct vision. There was good  hemostasis. I then enlarged the umbilical trocar site large  enough to remove the prostate and I closed the fascia here with  #0-PDS sutures in a running fashion. All  the port sites were irrigated. Marcaine 0.25% with epinephrine  was injected into all the trocar sites. The skin was closed with  4-0 Monocryl in running subcuticular fashion. Steri-Strips were  applied. At this point patient was awakened and extubated in the operating  room and taken to the recovery room in stable condition. There  were no complications. All counts correct. Estimated blood loss  was 150 cc.

## 2019-01-24 VITALS
RESPIRATION RATE: 19 BRPM | HEIGHT: 71 IN | DIASTOLIC BLOOD PRESSURE: 93 MMHG | BODY MASS INDEX: 30.11 KG/M2 | TEMPERATURE: 98.2 F | HEART RATE: 75 BPM | OXYGEN SATURATION: 95 % | WEIGHT: 215.06 LBS | SYSTOLIC BLOOD PRESSURE: 148 MMHG

## 2019-01-24 LAB
ANION GAP SERPL CALC-SCNC: 14 MMOL/L (ref 3–18)
BUN SERPL-MCNC: 19 MG/DL (ref 7–18)
BUN/CREAT SERPL: 19 (ref 12–20)
CALCIUM SERPL-MCNC: 8.1 MG/DL (ref 8.5–10.1)
CHLORIDE SERPL-SCNC: 101 MMOL/L (ref 100–108)
CO2 SERPL-SCNC: 25 MMOL/L (ref 21–32)
CREAT SERPL-MCNC: 1 MG/DL (ref 0.6–1.3)
ERYTHROCYTE [DISTWIDTH] IN BLOOD BY AUTOMATED COUNT: 13.9 % (ref 11.6–14.5)
GLUCOSE SERPL-MCNC: 116 MG/DL (ref 74–99)
HCT VFR BLD AUTO: 37.9 % (ref 36–48)
HGB BLD-MCNC: 12.9 G/DL (ref 13–16)
MCH RBC QN AUTO: 31.9 PG (ref 24–34)
MCHC RBC AUTO-ENTMCNC: 34 G/DL (ref 31–37)
MCV RBC AUTO: 93.6 FL (ref 74–97)
PLATELET # BLD AUTO: 133 K/UL (ref 135–420)
PMV BLD AUTO: 11.8 FL (ref 9.2–11.8)
POTASSIUM SERPL-SCNC: 3.4 MMOL/L (ref 3.5–5.5)
RBC # BLD AUTO: 4.05 M/UL (ref 4.7–5.5)
SODIUM SERPL-SCNC: 140 MMOL/L (ref 136–145)
WBC # BLD AUTO: 9.6 K/UL (ref 4.6–13.2)

## 2019-01-24 PROCEDURE — 77030027138 HC INCENT SPIROMETER -A

## 2019-01-24 PROCEDURE — 77010033678 HC OXYGEN DAILY

## 2019-01-24 PROCEDURE — 80048 BASIC METABOLIC PNL TOTAL CA: CPT

## 2019-01-24 PROCEDURE — 74011250637 HC RX REV CODE- 250/637: Performed by: UROLOGY

## 2019-01-24 PROCEDURE — 85027 COMPLETE CBC AUTOMATED: CPT

## 2019-01-24 PROCEDURE — 77030010545

## 2019-01-24 PROCEDURE — 74011250636 HC RX REV CODE- 250/636: Performed by: UROLOGY

## 2019-01-24 PROCEDURE — 36415 COLL VENOUS BLD VENIPUNCTURE: CPT

## 2019-01-24 RX ADMIN — PANTOPRAZOLE SODIUM 40 MG: 40 TABLET, DELAYED RELEASE ORAL at 08:53

## 2019-01-24 RX ADMIN — CEFAZOLIN SODIUM 2 G: 2 SOLUTION INTRAVENOUS at 13:13

## 2019-01-24 RX ADMIN — CEFAZOLIN SODIUM 2 G: 2 SOLUTION INTRAVENOUS at 05:36

## 2019-01-24 RX ADMIN — OXYCODONE AND ACETAMINOPHEN 1 TABLET: 5; 325 TABLET ORAL at 05:37

## 2019-01-24 RX ADMIN — Medication 10 ML: at 08:54

## 2019-01-24 RX ADMIN — HEPARIN SODIUM 5000 UNITS: 5000 INJECTION, SOLUTION INTRAVENOUS; SUBCUTANEOUS at 05:37

## 2019-01-24 RX ADMIN — HEPARIN SODIUM 5000 UNITS: 5000 INJECTION, SOLUTION INTRAVENOUS; SUBCUTANEOUS at 13:14

## 2019-01-24 RX ADMIN — HYDROMORPHONE HYDROCHLORIDE 1 MG: 1 INJECTION, SOLUTION INTRAMUSCULAR; INTRAVENOUS; SUBCUTANEOUS at 03:44

## 2019-01-24 NOTE — PROGRESS NOTES
Problem: Discharge Planning Goal: *Discharge to safe environment Outcome: Resolved/Met Date Met: 01/24/19 
home

## 2019-01-24 NOTE — PROGRESS NOTES
Problem: Falls - Risk of 
Goal: *Absence of Falls Document Margot Brown Fall Risk and appropriate interventions in the flowsheet. Outcome: Progressing Towards Goal 
Fall Risk Interventions: 
  
 
  
 
Medication Interventions: Patient to call before getting OOB History of Falls Interventions: Bed/chair exit alarm

## 2019-01-24 NOTE — ANESTHESIA POSTPROCEDURE EVALUATION
Procedure(s): 
DaVinci laparoscopic radical prostatectomy (Right full and left partial nerve Sparing). Anesthesia Post Evaluation Multimodal analgesia: multimodal analgesia used between 6 hours prior to anesthesia start to PACU discharge Patient location during evaluation: bedside Patient participation: complete - patient participated Level of consciousness: awake Pain score: 3 Pain management: adequate Airway patency: patent Anesthetic complications: no 
Cardiovascular status: stable Respiratory status: acceptable Hydration status: acceptable Post anesthesia nausea and vomiting:  none Visit Vitals /84 Pulse 96 Temp 36.7 °C (98.1 °F) Resp 18 Ht 5' 11\" (1.803 m) Wt 97.6 kg (215 lb 1 oz) SpO2 100% BMI 30.00 kg/m²

## 2019-01-24 NOTE — PROGRESS NOTES
conducted an initial consultation and Spiritual Assessment for [employer], who is a 58 y.o.,male. Patients Primary Language is: [place]. According to the patients EMR [facility] Affiliation is: No preference. The reason the Patient came to the hospital is:  
Patient Active Problem List  
 Diagnosis Date Noted  Malignant neoplasm of prostate ([facility] [address] 2019  Urinary urgency 2018  Elevated PSA 2018 The  provided the following Interventions: 
Initiated a relationship of care and support. Provided information about Spiritual Care Services. Offered prayer and assurance of continued prayers on patient's behalf. The following outcomes were achieved: 
Patient expressed gratitude for 's visit. Assessment: 
There are no further spiritual or Druze issues which require intervention at this time. Plan: 
Chaplains will continue to follow and will provide pastoral care on an as needed/requested basis. Cuba Chau M.Div. , [address] [address] [facility]: [phone]/[phone]

## 2019-01-24 NOTE — PERIOP NOTES
TRANSFER - OUT REPORT: 
 
Verbal report given to cindy forte(name) on [employer]  being transferred to 2220(unit) for routine post - op Report consisted of patients Situation, Background, Assessment and  
Recommendations(SBAR). Information from the following report(s) SBAR, OR Summary, Intake/Output and MAR was reviewed with the receiving nurse. Lines:  
Peripheral IV 01/23/19 Left Hand (Active) Site Assessment Clean, dry, & intact 1/23/2019  5:42 PM  
Phlebitis Assessment 0 1/23/2019  5:42 PM  
Infiltration Assessment 0 1/23/2019  5:42 PM  
Dressing Status Clean, dry, & intact 1/23/2019  5:42 PM  
Dressing Type Tape;Transparent 1/23/2019  5:42 PM  
Hub Color/Line Status Infusing;Pink 1/23/2019  5:42 PM  
   
Peripheral IV 01/23/19 Right Forearm (Active) Site Assessment Clean, dry, & intact 1/23/2019  5:42 PM  
Phlebitis Assessment 0 1/23/2019  5:42 PM  
Infiltration Assessment 0 1/23/2019  5:42 PM  
Dressing Status Clean, dry, & intact 1/23/2019  5:42 PM  
Dressing Type Transparent;Tape 1/23/2019  5:42 PM  
Hub Color/Line Status Capped;Pink 1/23/2019  5:42 PM  
  
 
Opportunity for questions and clarification was provided. Patient transported with: 
 Registered Nurse

## 2019-01-24 NOTE — DISCHARGE SUMMARY
Discharge Summary    Patient: Orlando Blackburn               Sex: male          DOA: 1/23/2019         YOB: 1955      Age:  58 y.o.        LOS:  LOS: 1 day                Admit Date: 1/23/2019    Discharge Date: 1/24/2019    Admission Diagnoses: prostate cancer  c61; Malignant neoplasm of [facility])    Discharge Diagnoses:    Problem List as of 1/24/2019 Date Reviewed: 1/15/2019          Codes Class Noted - Resolved    Malignant neoplasm of [facility]) ICD-10-CM: C61  ICD-9-CM: 185  1/23/2019 - Present        Urinary urgency ICD-10-CM: R39.15  ICD-9-CM: 788.63  9/21/2018 - Present        Elevated PSA ICD-10-CM: R97.20  ICD-9-CM: 790.93  9/21/2018 - Present              Discharge Condition: Stable    Hospital Course: Unremarkable: See chart for further detials           Progress Note    Patient: Orlando Blackburn MRN: [id number]  SSN: [id number]    YOB: 1955  Age: 58 y.o. Sex: male      Admit Date: 1/23/2019    LOS: 1 day     Subjective:     No complaints, mild hiccups    Objective:     Vitals:    01/23/19 2353 01/23/19 2355 01/24/19 0344 01/24/19 0919   BP: 175/87 158/90 142/79 (!) 148/93   Pulse: (!) 101 99 (!) 103 75   Resp: 17 18 19 19   Temp: 99 °F (37.2 °C) 98.8 °F (37.1 °C) 99.5 °F (37.5 °C) 98.2 °F (36.8 °C)   SpO2: 98% 98% 98% 95%   Weight:       Height:            Intake and Output:  Current Shift: 01/24 0701 - 01/24 1900  In: 1520 [P.O.:1020; I.V.:500]  Out: 600 [Urine:600]  Last three shifts: 01/22 1901 - 01/24 0700  In: 4822.9 [P.O.:2100;  I.V.:2722.9]  Out: 2925 [Urine:2675]    Physical Exam:   GENERAL: alert, cooperative, no distress, appears stated age  LUNG: unlabored  HEART: regular rate and rhythm  ABDOMEN: Soft, Non tender  EXTREMITIES:  extremities normal, atraumatic, no cyanosis or edema  SKIN: Normal.  PSYCHIATRIC: non focal  INCISION: clean, dry, intact    Lab/Data Review:    Lab Results   Component Value Date/Time    WBC 9.6 01/24/2019 04:20 AM    HGB 12.9 (L) 01/24/2019 04:20 AM    HCT 37.9 01/24/2019 04:20 AM    PLATELET 509 (L) 47/52/8901 04:20 AM    MCV 93.6 01/24/2019 04:20 AM       Lab Results   Component Value Date/Time    Sodium 140 01/24/2019 04:20 AM    Potassium 3.4 (L) 01/24/2019 04:20 AM    Chloride 101 01/24/2019 04:20 AM    CO2 25 01/24/2019 04:20 AM    Anion gap 14 01/24/2019 04:20 AM    Glucose 116 (H) 01/24/2019 04:20 AM    BUN 19 (H) 01/24/2019 04:20 AM    Creatinine 1.00 01/24/2019 04:20 AM    BUN/Creatinine ratio 19 01/24/2019 04:20 AM    GFR est AA >60 01/24/2019 04:20 AM    GFR est non-AA >60 01/24/2019 04:20 AM    Calcium 8.1 (L) 01/24/2019 04:20 AM         POD 1 s/p ralp    Assessment:     Active Problems:    Malignant neoplasm of prostate ([facility] [address] (1/23/2019)        Plan:     Frances Fell  Reg diet  Leg bag teaching  Home if tolerates diet, po pain medication  Heparin    Call MD if fever >101.5, signs of infection, intractable pain, nausea or vomiting, worsening shortness of breath or chest pain, significant bleeding, mckeon not draining, painful leg swelling, or any questions or concerns. Only let a urologist do anything with you mckeon catheter while it is in place. No heavy lifting >15 lbs for 4 weeks. No driving on narcotics. OK to shower 48 hours after surgery. No baths or submerging incisions for 4 weeks until incisions are completely healed. Signed By: Machelle Green MD     January 24, 2019            Consults: None    Significant Diagnostic Studies: None    Discharge Medications:     Current Discharge Medication List      START taking these medications    Details   oxyCODONE-acetaminophen (PERCOCET) 5-325 mg per tablet Take 1 Tab by mouth every six (6) hours as needed for Pain. Max Daily Amount: 4 Tabs. Qty: 20 Tab, Refills: 0    Associated Diagnoses: Malignant neoplasm of prostate ([address]      docusate sodium (COLACE) 100 mg capsule Take 1 Cap by mouth two (2) times a day for 30 days. As needed for constipation.  Withold for loose stools. Qty: 60 Cap, Refills: 0      oxybutynin (DITROPAN) 5 mg tablet Take 1 Tab by mouth three (3) times daily. As needed for bladder spasms, pls withold 24 hrs prior to catheter removal.  Qty: 30 Tab, Refills: 0         CONTINUE these medications which have CHANGED    Details   vardenafil (LEVITRA) 20 mg tablet Take 20 mg by mouth daily as needed. Restart after 6 weeks. Qty: 36 Tab, Refills: 3         CONTINUE these medications which have NOT CHANGED    Details   terazosin (HYTRIN) 10 mg capsule Take 1 Cap by mouth nightly. Qty: 90 Cap, Refills: 3      metoprolol succinate (TOPROL-XL) 25 mg XL tablet       lansoprazole (PREVACID) 30 mg capsule 30 mg two (2) times a day. peg 400-propylene glycol (SYSTANE) 0.4-0.3 % drop Instill 1 Drop in eye Twice Daily. rosuvastatin (CRESTOR) 20 mg tablet 20 mg.      dilTIAZem ER (CARDIZEM LA) 180 mg Tb24 tablet Take 180 mg by mouth daily. ALPRAZolam (XANAX) 0.25 mg tablet 0.25 mg.      MV-Min-Folic Acid-Lutein 834-617 mcg chew Take 1 Tab by Mouth Once a Day. coenzyme q10 10 mg cap Take  by mouth. Biotin 2,500 mcg cap Take 5,000 Units by mouth two (2) times a day. cetirizine (ZYRTEC) 10 mg tablet Take  by mouth. Activity: As tolerated    Diet: Regular    Wound Care:  May shower    Follow-up: As scheduled

## 2019-01-24 NOTE — DISCHARGE INSTRUCTIONS
DISCHARGE SUMMARY from Nurse    PATIENT INSTRUCTIONS:    After general anesthesia or intravenous sedation, for 24 hours or while taking prescription Narcotics:  · Limit your activities  · Do not drive and operate hazardous machinery  · Do not make important personal or business decisions  · Do  not drink alcoholic beverages  · If you have not urinated within 8 hours after discharge, please contact your surgeon on call. Report the following to your surgeon:  · Excessive pain, swelling, redness or odor of or around the surgical area  · Temperature over 100.5  · Nausea and vomiting lasting longer than 4 hours or if unable to take medications  · Any signs of decreased circulation or nerve impairment to extremity: change in color, persistent  numbness, tingling, coldness or increase pain  · Any questions    What to do at Home:  Recommended activity: Activity as tolerated, see surgeon's instructions. If you experience any of the symptoms above, please follow up with Dr. Samir Duff. *  Please give a list of your current medications to your Primary Care Provider. *  Please update this list whenever your medications are discontinued, doses are      changed, or new medications (including over-the-counter products) are added. *  Please carry medication information at all times in case of emergency situations. These are general instructions for a healthy lifestyle:    No smoking/ No tobacco products/ Avoid exposure to second hand smoke  Surgeon General's Warning:  Quitting smoking now greatly reduces serious risk to your health.     Obesity, smoking, and sedentary lifestyle greatly increases your risk for illness    A healthy diet, regular physical exercise & weight monitoring are important for maintaining a healthy lifestyle    You may be retaining fluid if you have a history of heart failure or if you experience any of the following symptoms:  Weight gain of 3 pounds or more overnight or 5 pounds in a week, increased swelling in our hands or feet or shortness of breath while lying flat in bed. Please call your doctor as soon as you notice any of these symptoms; do not wait until your next office visit. Recognize signs and symptoms of STROKE:    F-face looks uneven    A-arms unable to move or move unevenly    S-speech slurred or non-existent    T-time-call 911 as soon as signs and symptoms begin-DO NOT go       Back to bed or wait to see if you get better-TIME IS BRAIN. Warning Signs of HEART ATTACK     Call 911 if you have these symptoms:   Chest discomfort. Most heart attacks involve discomfort in the center of the chest that lasts more than a few minutes, or that goes away and comes back. It can feel like uncomfortable pressure, squeezing, fullness, or pain.  Discomfort in other areas of the upper body. Symptoms can include pain or discomfort in one or both arms, the back, neck, jaw, or stomach.  Shortness of breath with or without chest discomfort.  Other signs may include breaking out in a cold sweat, nausea, or lightheadedness. Don't wait more than five minutes to call 911 - MINUTES MATTER! Fast action can save your life. Calling 911 is almost always the fastest way to get lifesaving treatment. Emergency Medical Services staff can begin treatment when they arrive -- up to an hour sooner than if someone gets to the hospital by car. The discharge information has been reviewed with the patient. The patient verbalized understanding. Discharge medications reviewed with the patient and appropriate educational materials and side effects teaching were provided. Patient armband removed and shredded. ___________________________________________________________________________________________________________________________________DISCHARGE INSTRUCTIONS:      No heavy lifting or strenuous activity for 6 weeks.     You should have an appointment with your surgeon to be seen in the office 1 to 2 weeks postop. If you are unaware of the time and date of that appointment, call the urology office at [phone]. You will see some blood in your urine on and off for several weeks and pass some clots through the catheter. You may have some \"bladder spasms\" which are identified by lower abdominal cramping and leakage around the catheter. As long as the catheter is draining into the bag, this is a normal occurrence. Do not drive on narcotics. You may shower in 24 hours. No tub baths until your catheter has been removed. Take pain meds as needed. Be aware that any narcotic medicines may be constipating. If you need to take narcotic pain meds, you may want to take an over the counter stool softener also.

## 2019-01-24 NOTE — PROGRESS NOTES
2043 Received patient from OR/PACU, with report. Patient is alert and oriented x 4. Wife was present on admission. 2200 Patient requested fresh Iced drink. 0005 Patient requested fresh drink. 0205 Patient requested fresh drink, Noted that he was feeling hiccups, this was particularly alarming since Surgery was in the abdomen, believed drinking cold fluids was treating it. 0545 Ambulated x 300' x 5 minutes, tolerated well. 
 
0715 Bedside and Verbal shift change report given to [address] [facility] (oncoming nurse) by Latonia Wellington RN (offgoing nurse). Report included the following information SBAR, Intake/Output, MAR and Recent Results.

## 2019-01-24 NOTE — PROGRESS NOTES
Physical Exam  
Constitutional: He is oriented to person, place, and time. He appears well-developed and well-nourished. Neck: Normal range of motion. Cardiovascular: Normal rate. Pulmonary/Chest: Effort normal and breath sounds normal.  
Abdominal: Soft. Genitourinary:  
Genitourinary Comments: Hernandez intact. Musculoskeletal: Normal range of motion. Neurological: He is alert and oriented to person, place, and time. Skin: Skin is warm and dry. Psychiatric: He has a normal mood and affect. Alex DKathy Cortes  Is rounding. Made aware of pt's K+ level. Pt is for possible discharge this afternoon after lunch.

## 2019-01-24 NOTE — PROGRESS NOTES
Reason for Admission:   prostatectomy RRAT Score:       2 Plan for utilizing home health:  no     
                 
Likelihood of Readmission:  low Transition of Care Plan:  Spoke with pt, lives with spouse. Designates her for dcp and transport home. Has cpap, no other dme. pcp dr Vandana Gutiérrez. Demographics correct. He states he will dc home today. Plan home, no needs anticipated Patient has designated ______spouse__________________ to participate in his/her discharge plan and to receive any needed information. Name: Prashanth Taylor Address: 
Phone number: [phone] Care Management Interventions PCP Verified by CM: Yes 
Palliative Care Criteria Met (RRAT>21 & CHF Dx)?: No 
Mode of Transport at Discharge: Other (see comment) Transition of Care Consult (CM Consult): Discharge Planning Discharge Durable Medical Equipment: No 
Physical Therapy Consult: No 
Occupational Therapy Consult: No 
Speech Therapy Consult: No 
Current Support Network: Lives with Spouse Confirm Follow Up Transport: Family Plan discussed with Pt/Family/Caregiver: Yes Discharge Location Discharge Placement: Home

## 2019-01-24 NOTE — PERIOP NOTES
Wife continually updated on patients status ,patient resting states no pain. awaiting bed to be cleaned

## 2019-02-26 PROBLEM — Z01.810 PREOPERATIVE CARDIOVASCULAR EXAMINATION: Status: ACTIVE | Noted: 2019-01-09

## 2019-02-26 PROBLEM — K56.609 SMALL BOWEL OBSTRUCTION (HCC): Status: ACTIVE | Noted: 2017-11-15

## 2019-08-26 ENCOUNTER — NURSE NAVIGATOR (OUTPATIENT)
Dept: OTHER | Age: 64
End: 2019-08-26

## 2019-08-26 NOTE — NURSE NAVIGATOR
Follow up call to pt to review survivorship care plan that was mailed. Reviewed care plan with pt. States he will call with questions in the future. Survivorship care plan has been scanned into chart and routed to PCP. All questions answered. Information given regarding prostate cancer support group.

## 2022-05-04 PROBLEM — E87.1 HYPONATREMIA: Status: ACTIVE | Noted: 2022-05-04

## 2022-05-04 PROBLEM — I62.9 INTRACRANIAL HEMORRHAGE (HCC): Status: ACTIVE | Noted: 2022-02-12

## 2022-05-04 PROBLEM — I61.0 NONTRAUMATIC SUBCORTICAL HEMORRHAGE OF RIGHT CEREBRAL HEMISPHERE (HCC): Status: ACTIVE | Noted: 2022-05-04

## 2022-05-04 PROBLEM — I63.9 CVA (CEREBRAL VASCULAR ACCIDENT) (HCC): Status: ACTIVE | Noted: 2022-03-01

## 2022-05-04 PROBLEM — I61.5 IVH (INTRAVENTRICULAR HEMORRHAGE) (HCC): Status: ACTIVE | Noted: 2022-02-12

## 2022-08-30 ENCOUNTER — POST-OP (OUTPATIENT)
Age: 67
End: 2022-08-30

## 2022-08-30 DIAGNOSIS — Z96.1: ICD-10-CM

## 2022-08-30 PROCEDURE — 99024 POSTOP FOLLOW-UP VISIT: CPT

## 2022-08-30 ASSESSMENT — TONOMETRY
OS_IOP_MMHG: 14
OD_IOP_MMHG: 14

## 2022-08-30 ASSESSMENT — VISUAL ACUITY
OS_CC: 20/25
OU_CC: 20/20
OD_CC: 20/25
OS_CC: 20/25
OU_CC: 20/25
OD_CC: 20/25

## 2022-10-04 ENCOUNTER — POST-OP (OUTPATIENT)
Age: 67
End: 2022-10-04

## 2022-10-04 DIAGNOSIS — H26.493: ICD-10-CM

## 2022-10-04 DIAGNOSIS — Z96.1: ICD-10-CM

## 2022-10-04 PROCEDURE — 99024 POSTOP FOLLOW-UP VISIT: CPT

## 2022-10-04 ASSESSMENT — KERATOMETRY
OD_AXISANGLE2_DEGREES: 123
OS_AXISANGLE_DEGREES: 108
OD_AXISANGLE_DEGREES: 33
OD_K1POWER_DIOPTERS: 41.25
OS_K2POWER_DIOPTERS: 41.75
OD_K2POWER_DIOPTERS: 42.25
OS_AXISANGLE2_DEGREES: 18
OS_K1POWER_DIOPTERS: 41.50

## 2022-10-04 ASSESSMENT — VISUAL ACUITY
OU_CC: 20/20
OU_CC: 20/25-1
OS_CC: 20/25-1
OD_CC: 20/25-1

## 2022-10-04 ASSESSMENT — TONOMETRY
OS_IOP_MMHG: 12
OD_IOP_MMHG: 12

## 2023-07-18 ENCOUNTER — POST-OP (OUTPATIENT)
Facility: LOCATION | Age: 68
End: 2023-07-18

## 2023-07-18 DIAGNOSIS — Z98.890: ICD-10-CM

## 2023-07-18 PROCEDURE — 99024 POSTOP FOLLOW-UP VISIT: CPT

## 2023-07-18 ASSESSMENT — TONOMETRY
OS_IOP_MMHG: 10
OD_IOP_MMHG: 10

## 2023-07-18 ASSESSMENT — KERATOMETRY
OD_AXISANGLE_DEGREES: 61
OS_K1POWER_DIOPTERS: 41.75
OD_K1POWER_DIOPTERS: 41.25
OD_AXISANGLE2_DEGREES: 151
OS_AXISANGLE_DEGREES: 4
OD_K2POWER_DIOPTERS: 42.25
OS_K2POWER_DIOPTERS: 42.25
OS_AXISANGLE2_DEGREES: 94

## 2024-01-16 ENCOUNTER — COMPREHENSIVE EXAM (OUTPATIENT)
Facility: LOCATION | Age: 69
End: 2024-01-16

## 2024-01-16 DIAGNOSIS — H18.593: ICD-10-CM

## 2024-01-16 PROCEDURE — 92015 DETERMINE REFRACTIVE STATE: CPT

## 2024-01-16 PROCEDURE — 99214 OFFICE O/P EST MOD 30 MIN: CPT

## 2024-01-16 PROCEDURE — 92499OP2 OPTOMAP RETINAL SCREENING BOTH EYES

## 2024-01-16 ASSESSMENT — KERATOMETRY
OS_K1POWER_DIOPTERS: 41.75
OD_K2POWER_DIOPTERS: 42.25
OS_AXISANGLE2_DEGREES: 94
OD_AXISANGLE_DEGREES: 61
OD_K1POWER_DIOPTERS: 41.25
OD_AXISANGLE2_DEGREES: 151
OS_AXISANGLE_DEGREES: 4
OS_K2POWER_DIOPTERS: 42.25

## 2024-01-16 ASSESSMENT — VISUAL ACUITY
OU_CC: J20
OD_CC: 20/25
OS_CC: J20
OS_CC: 20/25
OD_CC: J20
OU_CC: 20/25

## 2024-01-16 ASSESSMENT — TONOMETRY
OS_IOP_MMHG: 10
OD_IOP_MMHG: 10

## (undated) DEVICE — CARTRIDGE CLP LIG HEMLOK GRN --

## (undated) DEVICE — LIGHT HANDLE: Brand: DEVON

## (undated) DEVICE — SYR IRR CATH TIP LR ADPT 70ML -- CONVERT TO ITEM 363120

## (undated) DEVICE — VISUALIZATION SYSTEM: Brand: CLEARIFY

## (undated) DEVICE — BLADE ASSEMB CLP HAIR FINE --

## (undated) DEVICE — KENDALL SCD EXPRESS SLEEVES, KNEE LENGTH, MEDIUM: Brand: KENDALL SCD

## (undated) DEVICE — TELFA NON-ADHERENT ABSORBENT DRESSING: Brand: TELFA

## (undated) DEVICE — BAG DRAIN URIN 2000ML LF STRL -- CONVERT TO ITEM 363123

## (undated) DEVICE — STERILE POLYISOPRENE POWDER-FREE SURGICAL GLOVES: Brand: PROTEXIS

## (undated) DEVICE — SPECIMEN RETRIEVAL POUCH: Brand: ENDO CATCH GOLD

## (undated) DEVICE — SUTURE ABSRB L6IN L37MM 0 GS-21 GRN 1/2 CIR TAPR PNT NDL VLOCL0306

## (undated) DEVICE — INTENDED FOR TISSUE SEPARATION, AND OTHER PROCEDURES THAT REQUIRE A SHARP SURGICAL BLADE TO PUNCTURE OR CUT.: Brand: BARD-PARKER ® CARBON RIB-BACK BLADES

## (undated) DEVICE — SUTURE MCRYL SZ 4-0 L18IN ABSRB UD L19MM PS-2 3/8 CIR PRIM Y496G

## (undated) DEVICE — 1010 S-DRAPE TOWEL DRAPE 10/BX: Brand: STERI-DRAPE™

## (undated) DEVICE — Device

## (undated) DEVICE — CLIP LIG ABSRB HEM-LOK LG PUR --

## (undated) DEVICE — SUTURE VCRL SZ 2-0 L27IN ABSRB UD L26MM SH 1/2 CIR J417H

## (undated) DEVICE — DRAPE KIT ACCS 4-ARM DISP -- DA VINCI

## (undated) DEVICE — NEEDLE HYPO 25GA L1.5IN BLU POLYPR HUB S STL REG BVL STR

## (undated) DEVICE — SYR 10ML CTRL LR LCK NSAF LF --

## (undated) DEVICE — CATHETER F BLLN 5CC 18FR 2 W HYDRGEL COAT LESS TRAUM LUB

## (undated) DEVICE — OBTRTR BLDELSS 8MM DISP -- DA VINCI - SNGL USE

## (undated) DEVICE — 2, DISPOSABLE SUCTION/IRRIGATOR WITH DISPOSABLE TIP: Brand: STRYKEFLOW

## (undated) DEVICE — CATH NEPHSTMY 4 WNG MCOT 24FR -- LTX

## (undated) DEVICE — SUTURE DEV SZ 3-0 V-LOC 90 L12IN TO L18IN CV-23 VLT VLOCM0844

## (undated) DEVICE — SOLUTION IV STRL H2O 500 ML AQUALITE POUR BTL

## (undated) DEVICE — APPLIER CLP M/L SHFT DIA5MM 15 LIG LIGAMAX 5

## (undated) DEVICE — TROCAR RMFG Z 3RD BLD 5X100MM --

## (undated) DEVICE — CLIP INT XL YEL POLYMER HEM-O-LOK WECK

## (undated) DEVICE — BLADELESS OPTICAL TROCAR WITH FIXATION CANNULA: Brand: VERSAONE

## (undated) DEVICE — Z DISCONTINUED USE 2639304 STRAP POS W3INXL30FT WIDE BK TO BK HK AND LOOP CLSR ALISTRP

## (undated) DEVICE — TRAY PREP DRY W/ PREM GLV 2 APPL 6 SPNG 2 UNDPD 1 OVERWRAP

## (undated) DEVICE — SUTURE V-LOC 90 3-0 L6IN ABSRB UD CV-23 L17MM 1/2 CIR VLOCM1904

## (undated) DEVICE — APPLICATOR BNDG 1MM ADH PREMIERPRO EXOFIN

## (undated) DEVICE — NEEDLE HYPO 25GA L1.5IN BVL ORIENTED ECLIPSE

## (undated) DEVICE — ELECTRO LUBE IS A SINGLE PATIENT USE DEVICE THAT IS INTENDED TO BE USED ON ELECTROSURGICAL ELECTRODES TO REDUCE STICKING.: Brand: KEY SURGICAL ELECTRO LUBE

## (undated) DEVICE — SUTURE VCRL SZ 0 L18IN ABSRB UD POLYGLACTIN 910 BRAID TIE J912G

## (undated) DEVICE — INSTRMT SET WND CLSR SUT PASS --

## (undated) DEVICE — SUTURE PDS II SZ 0 L27IN ABSRB VLT L36MM CT-1 1/2 CIR Z340H

## (undated) DEVICE — LAPAROSCOPIC SCISSORS: Brand: EPIX LAPAROSCOPIC SCISSORS

## (undated) DEVICE — DRAPE,UTILITY,TAPE,15X26,STERILE: Brand: MEDLINE

## (undated) DEVICE — (D)CAP REDUC 1 SEAL ENDOPTH -- DISC W/NO SUB

## (undated) DEVICE — SUTURE MCRYL SZ 4-0 L27IN ABSRB UD RB-1 L17MM 1/2 CIR Y214H

## (undated) DEVICE — SHEET,DRAPE,70X100,STERILE: Brand: MEDLINE

## (undated) DEVICE — SUTURE VCRL SZ 3-0 L27IN ABSRB UD L26MM SH 1/2 CIR J416H

## (undated) DEVICE — DEVICE SECUREMENT AD W/ TRICOT ANCHR PD FOR F LTX SIL CATH

## (undated) DEVICE — STAPLER SKIN H3.9MM WIRE DIA0.58MM CRWN 6.9MM 35 STPL FIX

## (undated) DEVICE — TIP COVER ACCESSORY

## (undated) DEVICE — SUT PROL 0 30IN CT1 BLU --

## (undated) DEVICE — BLADELESS OPTICAL TROCAR WITH FIXATION CANNULA: Brand: VERSAPORT

## (undated) DEVICE — REM POLYHESIVE ADULT PATIENT RETURN ELECTRODE: Brand: VALLEYLAB

## (undated) DEVICE — (D)PREP SKN CHLRAPRP APPL 26ML -- CONVERT TO ITEM 371833